# Patient Record
Sex: FEMALE | Race: WHITE | Employment: UNEMPLOYED | URBAN - METROPOLITAN AREA
[De-identification: names, ages, dates, MRNs, and addresses within clinical notes are randomized per-mention and may not be internally consistent; named-entity substitution may affect disease eponyms.]

---

## 2017-01-03 ENCOUNTER — TELEPHONE (OUTPATIENT)
Dept: SURGERY | Age: 57
End: 2017-01-03

## 2017-01-03 DIAGNOSIS — N60.11 FIBROCYSTIC BREAST CHANGES OF BOTH BREASTS: Primary | ICD-10-CM

## 2017-01-03 DIAGNOSIS — N60.12 FIBROCYSTIC BREAST CHANGES OF BOTH BREASTS: Primary | ICD-10-CM

## 2017-01-03 DIAGNOSIS — Z80.3 FAMILY HISTORY OF BREAST CANCER: ICD-10-CM

## 2017-01-03 DIAGNOSIS — Z85.3 HISTORY OF BREAST CANCER IN FEMALE: ICD-10-CM

## 2017-01-03 NOTE — TELEPHONE ENCOUNTER
Called today and L/M on nurse voicemail asking to be scheduled at [facility] for her yearly MRI in February. Also wanted to know when to have her next mammogram.     I called patient back. It looks like her mammogram done in August was only a RIGHT mammogram, so I advised that she is due for a BILATERAL screening mammogram after January 12, 2017. She will schedule this at [place], and I gave her the number to schedule. We had all of her images loaded from [facility] to our PACS system last year when she had an abnormal MRI at [place] [address]. I encouraged her to have her imaging done here at a [facility] to keep it all in the same place. She had wanted to go to [facility] because she can get a 7:30 appointment and can \"get in and out quickly. \"  I reviewed the many location choices that we have and again encouraged her to have all of her imaging at one of our facilities. I advised by doing this our providers are able to review imaging and consult with radiologists if necessary. She liked this idea and appreciated the suggestion. She would like to schedule her MRI at a [facility], probably [facility]. She would also like to schedule her follow-up in February, and I transferred her to one of of PSRs to schedule this. She was extremely appreciative of the phone call and information.

## 2017-01-12 DIAGNOSIS — Z12.39 BREAST CANCER SCREENING, HIGH RISK PATIENT: Primary | ICD-10-CM

## 2017-01-16 ENCOUNTER — HOSPITAL ENCOUNTER (OUTPATIENT)
Dept: MRI IMAGING | Age: 57
Discharge: HOME | End: 2017-01-16
Attending: NURSE PRACTITIONER
Payer: COMMERCIAL

## 2017-01-16 VITALS — WEIGHT: 150 LBS | BODY MASS INDEX: 24.21 KG/M2

## 2017-01-16 DIAGNOSIS — N60.11 FIBROCYSTIC BREAST CHANGES OF BOTH BREASTS: ICD-10-CM

## 2017-01-16 DIAGNOSIS — N60.12 FIBROCYSTIC BREAST CHANGES OF BOTH BREASTS: ICD-10-CM

## 2017-01-16 DIAGNOSIS — Z80.3 FAMILY HISTORY OF BREAST CANCER: ICD-10-CM

## 2017-01-16 PROCEDURE — 74011250636 HC RX REV CODE- 250/636: Performed by: NURSE PRACTITIONER

## 2017-01-16 PROCEDURE — 77059 MRI BREAST BI W WO CONT: CPT

## 2017-01-16 PROCEDURE — A9585 GADOBUTROL INJECTION: HCPCS | Performed by: NURSE PRACTITIONER

## 2017-01-16 RX ADMIN — GADOBUTROL 7 ML: 604.72 INJECTION INTRAVENOUS at 08:49

## 2017-01-17 ENCOUNTER — HOSPITAL ENCOUNTER (OUTPATIENT)
Dept: MAMMOGRAPHY | Age: 57
Discharge: HOME | End: 2017-01-17
Attending: NURSE PRACTITIONER
Payer: SELF-PAY

## 2017-01-17 ENCOUNTER — TELEPHONE (OUTPATIENT)
Dept: SURGERY | Age: 57
End: 2017-01-17

## 2017-01-17 DIAGNOSIS — N60.12 FIBROCYSTIC BREAST CHANGES OF BOTH BREASTS: ICD-10-CM

## 2017-01-17 DIAGNOSIS — N60.11 FIBROCYSTIC BREAST CHANGES OF BOTH BREASTS: ICD-10-CM

## 2017-01-17 DIAGNOSIS — Z85.3 HISTORY OF BREAST CANCER IN FEMALE: ICD-10-CM

## 2017-01-17 PROCEDURE — 77067 SCR MAMMO BI INCL CAD: CPT

## 2017-01-18 ENCOUNTER — TELEPHONE (OUTPATIENT)
Dept: SURGERY | Age: 57
End: 2017-01-18

## 2017-01-18 NOTE — TELEPHONE ENCOUNTER
Called patient, per Hal Choi NP, to let her know her MRI and mammogram results were normal. She is happy to hear this. Next appointment is on 2/7/17. She has no questions at this time.

## 2017-02-13 ENCOUNTER — DOCUMENTATION ONLY (OUTPATIENT)
Dept: SURGERY | Age: 57
End: 2017-02-13

## 2017-02-14 ENCOUNTER — OFFICE VISIT (OUTPATIENT)
Dept: SURGERY | Age: 57
End: 2017-02-14

## 2017-02-14 VITALS
WEIGHT: 150 LBS | HEIGHT: 66 IN | DIASTOLIC BLOOD PRESSURE: 86 MMHG | BODY MASS INDEX: 24.11 KG/M2 | HEART RATE: 87 BPM | SYSTOLIC BLOOD PRESSURE: 152 MMHG

## 2017-02-14 DIAGNOSIS — N60.12 FIBROCYSTIC BREAST CHANGES OF BOTH BREASTS: Primary | ICD-10-CM

## 2017-02-14 DIAGNOSIS — Z80.3 FAMILY HISTORY OF BREAST CANCER: ICD-10-CM

## 2017-02-14 DIAGNOSIS — N60.11 FIBROCYSTIC BREAST CHANGES OF BOTH BREASTS: Primary | ICD-10-CM

## 2017-02-14 RX ORDER — CLOTRIMAZOLE AND BETAMETHASONE DIPROPIONATE 10; .64 MG/G; MG/G
CREAM TOPICAL
Refills: 0 | COMMUNITY
Start: 2016-12-11

## 2017-02-14 NOTE — MR AVS SNAPSHOT
Visit Information Date & Time Provider Department Dept. Phone Encounter #  
 2/14/2017  9:00 AM Silvia Martinez NP [address] at [address] [place] [address] [id number] Upcoming Health Maintenance Date Due Hepatitis C Screening 1960 DTaP/Tdap/Td series (1 - Tdap) 7/25/1983 PAP AKA CERVICAL CYTOLOGY 7/25/1983 FOBT Q 1 YEAR AGE 50-75 7/25/2012 INFLUENZA AGE 9 TO ADULT 8/1/2016 BREAST CANCER SCRN MAMMOGRAM 1/17/2019 Allergies as of 2/14/2017  Review Complete On: 2/14/2017 By: Silvia Martinez NP Severity Noted Reaction Type Reactions Sulfa (Sulfonamide Antibiotics)  02/17/2016    Unknown (comments) Current Immunizations  Never Reviewed No immunizations on file. Not reviewed this visit You Were Diagnosed With   
  
 Codes Comments Fibrocystic breast changes of both breasts    -  Primary ICD-10-CM: N60.11, N60.12 ICD-9-CM: 610.1 Family history of breast cancer     ICD-10-CM: Z80.3 ICD-9-CM: V16.3 Vitals BP Pulse Height(growth percentile) Weight(growth percentile) BMI OB Status 152/86 87 5' 6\" (1.676 m) 150 lb (68 kg) 24.21 kg/m2 Having regular periods Smoking Status Never Smoker BMI and BSA Data Body Mass Index Body Surface Area  
 24.21 kg/m 2 1.78 m 2 Your Updated Medication List  
  
   
This list is accurate as of: 2/14/17  4:29 PM.  Always use your most recent med list.  
  
  
  
  
 clotrimazole-betamethasone topical cream  
Commonly known as:  LOTRISONE  
APPLY TO AFFECTED AREA(S) TWICE DAILY AS NEEDED Patient Instructions Breast Self-Exam: Care Instructions Your Care Instructions A breast self-exam is when you check your breasts for lumps or changes. This regular exam helps you learn how your breasts normally look and feel. Most breast problems or changes are not because of cancer. Breast self-exam is not a substitute for a mammogram. Having regular breast exams by your doctor and regular mammograms improve your chances of finding any problems with your breasts. Some women set a time each month to do a step-by-step breast self-exam. Other women like a less formal system. They might look at their breasts as they brush their teeth, or feel their breasts once in a while in the shower. If you notice a change in your breast, tell your doctor. Follow-up care is a key part of your treatment and safety. Be sure to make and go to all appointments, and call your doctor if you are having problems. Its also a good idea to know your test results and keep a list of the medicines you take. How do you do a breast self-exam? 
· The best time to examine your breasts is usually one week after your menstrual period begins. Your breasts should not be tender then. If you do not have periods, you might do your exam on a day of the month that is easy to remember. · To examine your breasts: ¨ Remove all your clothes above the waist and lie down. When you are lying down, your breast tissue spreads evenly over your chest wall, which makes it easier to feel all your breast tissue. ¨ Use the padsnot the fingertipsof the 3 middle fingers of your left hand to check your right breast. Move your fingers slowly in small coin-sized circles that overlap. ¨ Use three levels of pressure to feel of all your breast tissue. Use light pressure to feel the tissue close to the skin surface. Use medium pressure to feel a little deeper. Use firm pressure to feel your tissue close to your breastbone and ribs. Use each pressure level to feel your breast tissue before moving on to the next spot. ¨ Check your entire breast, moving up and down as if following a strip from the collarbone to the bra line, and from the armpit to the ribs.  Repeat until you have covered the entire breast. 
 ¨ Repeat this procedure for your left breast, using the pads of the 3 middle fingers of your right hand. · To examine your breasts while in the shower: 
¨ Place one arm over your head and lightly soap your breast on that side. ¨ Using the pads of your fingers, gently move your hand over your breast (in the strip pattern described above), feeling carefully for any lumps or changes. ¨ Repeat for the other breast. 
· Have your doctor inspect anything you notice to see if you need further testing. Where can you learn more? Go to http://rivera-jose angel.info/. Enter P148 in the search box to learn more about \"Breast Self-Exam: Care Instructions. \" Current as of: July 26, 2016 Content Version: 11.1 © 6725-6164 [employer]. Care instructions adapted under license by [employer] (which disclaims liability or warranty for this information). If you have questions about a medical condition or this instruction, always ask your healthcare professional. [place] [address] any warranty or liability for your use of this information. Introducing [facility]! Graciela Pagan introduces Ghostruck patient portal. Now you can access parts of your medical record, email your doctor's office, and request medication refills online. 1. In your internet browser, go to [email or web address]://[facility]. [employer]/[facility] 2. Click on the First Time User? Click Here link in the Sign In box. You will see the New Member Sign Up page. 3. Enter your Ghostruck Access Code exactly as it appears below. You will not need to use this code after youve completed the sign-up process. If you do not sign up before the expiration date, you must request a new code. · Ghostruck Access Code: [email or web address] Expires: 4/3/2017  1:55 PM 
 
4. Enter the last four digits of your Social Security Number (xxxx) and Date of Birth (mm/dd/yyyy) as indicated and click Submit.  You will be taken to the next sign-up page. 5. Create a Handprint ID. This will be your Handprint login ID and cannot be changed, so think of one that is secure and easy to remember. 6. Create a Handprint password. You can change your password at any time. 7. Enter your Password Reset Question and Answer. This can be used at a later time if you forget your password. 8. Enter your e-mail address. You will receive e-mail notification when new information is available in [address]. 9. Click Sign Up. You can now view and download portions of your medical record. 10. Click the Download Summary menu link to download a portable copy of your medical information. If you have questions, please visit the Frequently Asked Questions section of the Handprint website. Remember, Handprint is NOT to be used for urgent needs. For medical emergencies, dial 911. Now available from your iPhone and Android! Please provide this summary of care documentation to your next provider. Your primary care clinician is listed as Patience Hunt. If you have any questions after today's visit, please call [phone].

## 2017-02-14 NOTE — PATIENT INSTRUCTIONS
Breast Self-Exam: Care Instructions  Your Care Instructions  A breast self-exam is when you check your breasts for lumps or changes. This regular exam helps you learn how your breasts normally look and feel. Most breast problems or changes are not because of cancer. Breast self-exam is not a substitute for a mammogram. Having regular breast exams by your doctor and regular mammograms improve your chances of finding any problems with your breasts. Some women set a time each month to do a step-by-step breast self-exam. Other women like a less formal system. They might look at their breasts as they brush their teeth, or feel their breasts once in a while in the shower. If you notice a change in your breast, tell your doctor. Follow-up care is a key part of your treatment and safety. Be sure to make and go to all appointments, and call your doctor if you are having problems. Its also a good idea to know your test results and keep a list of the medicines you take. How do you do a breast self-exam?  · The best time to examine your breasts is usually one week after your menstrual period begins. Your breasts should not be tender then. If you do not have periods, you might do your exam on a day of the month that is easy to remember. · To examine your breasts:  ¨ Remove all your clothes above the waist and lie down. When you are lying down, your breast tissue spreads evenly over your chest wall, which makes it easier to feel all your breast tissue. ¨ Use the pads--not the fingertips--of the 3 middle fingers of your left hand to check your right breast. Move your fingers slowly in small coin-sized circles that overlap. ¨ Use three levels of pressure to feel of all your breast tissue. Use light pressure to feel the tissue close to the skin surface. Use medium pressure to feel a little deeper. Use firm pressure to feel your tissue close to your breastbone and ribs.  Use each pressure level to feel your breast tissue before moving on to the next spot. ¨ Check your entire breast, moving up and down as if following a strip from the collarbone to the bra line, and from the armpit to the ribs. Repeat until you have covered the entire breast.  ¨ Repeat this procedure for your left breast, using the pads of the 3 middle fingers of your right hand. · To examine your breasts while in the shower:  ¨ Place one arm over your head and lightly soap your breast on that side. ¨ Using the pads of your fingers, gently move your hand over your breast (in the strip pattern described above), feeling carefully for any lumps or changes. ¨ Repeat for the other breast.  · Have your doctor inspect anything you notice to see if you need further testing. Where can you learn more? Go to http://rivera-jose angel.info/. Enter P148 in the search box to learn more about \"Breast Self-Exam: Care Instructions. \"  Current as of: July 26, 2016  Content Version: 11.1  © 2596-8313 [employer]. Care instructions adapted under license by [employer] (which disclaims liability or warranty for this information). If you have questions about a medical condition or this instruction, always ask your healthcare professional. [place] [address] any warranty or liability for your use of this information.

## 2017-02-14 NOTE — PROGRESS NOTES
HISTORY OF PRESENT ILLNESS  Francisca Saldana is a 49 y.o. female. HPI  ESTABLISHED patient here today for follow up as a high risk patient due to her family history. Denies any breast problems at this time. Had abnormal MRI in 2016, but no biopsy was required    OB History      Para Term  AB TAB SAB Ectopic Multiple Living    0 0 0 0 0 0 0 0 0 0        Obstetric Comments    Menarche:  14. LMP: perimenopausal.  # of Children:  0. Age at Delivery of First Child:  n/a. Hysterectomy/oophorectomy:  NO/NO. Breast Bx:  no.  Hx of Breast Feeding:  n/a. BCP:  Yes  to . Hormone therapy:  no.         Past Surgical History   Procedure Laterality Date    Hx tubal ligation      Hx urological       donated a kidney     FH includes-  Mother who was diagnosed with breast cancer at age 61. Still living. Recent imaging-  Breast MRI on 17- BIRADS 2  FINDINGS: Background parenchymal enhancement is minimal to mild . A 2.0 cm T2  hyperintense mass in the right upper quadrant posteriorly with ghosting may  represent a hepatic cyst in segment 6, as there is is no contrast enhancement. This portion of the liver was not included on prior examinations, and there is  no prior abdominal imaging in this system. .  Right breast: An oval relatively well-circumscribed mass appears deep in the  right inner breast at 3:00 in the posterior coronal third, showing minimal  delayed enhancement, measuring 1.7 x 0.7 x 0.9 cm, compared to similar  measurements of 1.8 x 0.7 x 0.8 cm on the prior study. No other dominant  abnormality is suggested. There is no lymphadenopathy. Left breast: No dominant abnormality of morphology or enhancement to suggest  malignancy. No lymphadenopathy. IMPRESSION:  1. Right BI-RADS 2, benign. Left BI-RADS 2, benign. 2. RIGHT BREAST: Stable benign-appearing mass deep in the right breast at 3:00,  consistent with the previous impression of fibroadenoma.  No MRI sign of  malignancy. 3. LEFT BREAST: No MRI sign of malignancy. 4. LIVER: Incidentally noted mass in segment 6 thought to represent a cyst.  Comparison with any prior abdominal imaging may be helpful. 5. RECOMMENDATION: Annual screening mammography and annual screening MRI in this  high-risk patient. 6. A summary portfolio has been created for reference and is available in PACS. 3D bilateral screening mammogram on 1/17/17- BIRADS 1  FINDINGS: Bilateral digital screening mammography was performed, and is  interpreted in conjunction with a computer assisted detection (CAD) system. In  addition to standard 2-D bilateral CC and MLO views, bilateral 3-D/tomosynthesis  was performed in CC and MLO projections. No suspicious masses or calcifications are identified. There is no skin  thickening or nipple retraction. There has been no significant change. IMPRESSION:  BIRADS 1: Negative. No mammographic evidence of malignancy. ROS    Physical Exam   Constitutional: She is oriented to person, place, and time. She appears well-developed and well-nourished. Pulmonary/Chest: Right breast exhibits no inverted nipple, no mass, no nipple discharge, no skin change and no tenderness. Left breast exhibits no inverted nipple, no mass, no nipple discharge, no skin change and no tenderness. Breasts are symmetrical.       Musculoskeletal: Normal range of motion. UE x 2   Lymphadenopathy:     She has no cervical adenopathy. She has no axillary adenopathy. Right: No supraclavicular adenopathy present. Left: No supraclavicular adenopathy present. Neurological: She is alert and oriented to person, place, and time. Psychiatric: She has a normal mood and affect. Her speech is normal and behavior is normal.     Visit Vitals    /86    Pulse 87    Ht 5' 6\" (1.676 m)    Wt 150 lb (68 kg)    BMI 24.21 kg/m2     ASSESSMENT and PLAN  Encounter Diagnoses   Name Primary?     Fibrocystic breast changes of both breasts Yes    Family history of breast cancer      Normal exam and imaging. We discussed her risk based on her family history. The patient's risk of breast cancer was calculated using the [address] Her 10 year risk is 8% (compared with a population risk of 3%). Her lifetime risk is 26% (compared with a population risk of 10.5%). This risk was reviewed with the patient. She will plan to be seen here in 1 year and have her annual screening mammogram and screening MRI. She will also have a CBE with her GYN. We will continue to discuss if screening MRIs and annual visits here are necessary moving forward. She is comfortable with this plan. All questions answered and she stated understanding. Patient also inquired about a dermatologist and was referred to Dr. Elizabeth Priest at [facility].

## 2017-08-07 ENCOUNTER — TELEPHONE (OUTPATIENT)
Dept: SURGERY | Age: 57
End: 2017-08-07

## 2018-01-03 DIAGNOSIS — Z91.89 AT HIGH RISK FOR BREAST CANCER: Primary | ICD-10-CM

## 2018-01-04 ENCOUNTER — DOCUMENTATION ONLY (OUTPATIENT)
Dept: SURGERY | Age: 58
End: 2018-01-04

## 2018-01-04 NOTE — PROGRESS NOTES
Appointments for this Order   1/18/2018 0815 - 45 min MARY MRI 1 (Resource) [facility]

## 2018-01-18 ENCOUNTER — HOSPITAL ENCOUNTER (OUTPATIENT)
Dept: MAMMOGRAPHY | Age: 58
Discharge: HOME | End: 2018-01-18
Attending: SURGERY
Payer: COMMERCIAL

## 2018-01-18 ENCOUNTER — HOSPITAL ENCOUNTER (OUTPATIENT)
Dept: MRI IMAGING | Age: 58
Discharge: HOME | End: 2018-01-18
Attending: NURSE PRACTITIONER
Payer: COMMERCIAL

## 2018-01-18 ENCOUNTER — TELEPHONE (OUTPATIENT)
Dept: SURGERY | Age: 58
End: 2018-01-18

## 2018-01-18 VITALS — BODY MASS INDEX: 24.21 KG/M2 | WEIGHT: 150 LBS

## 2018-01-18 DIAGNOSIS — Z12.39 SCREENING BREAST EXAMINATION: ICD-10-CM

## 2018-01-18 DIAGNOSIS — Z91.89 AT HIGH RISK FOR BREAST CANCER: ICD-10-CM

## 2018-01-18 LAB — CREAT BLD-MCNC: 0.9 MG/DL (ref 0.6–1.3)

## 2018-01-18 PROCEDURE — 77059 MRI BREAST BI W WO CONT: CPT

## 2018-01-18 PROCEDURE — 82565 ASSAY OF CREATININE: CPT

## 2018-01-18 PROCEDURE — 74011250636 HC RX REV CODE- 250/636: Performed by: NURSE PRACTITIONER

## 2018-01-18 PROCEDURE — 77063 BREAST TOMOSYNTHESIS BI: CPT

## 2018-01-18 PROCEDURE — A9585 GADOBUTROL INJECTION: HCPCS | Performed by: NURSE PRACTITIONER

## 2018-01-18 RX ADMIN — GADOBUTROL 7 ML: 604.72 INJECTION INTRAVENOUS at 08:38

## 2018-01-18 NOTE — TELEPHONE ENCOUNTER
Patient called asking about her MRI and mammogram results, which she had today. I called her back to give her the MRI results, which were normal. I told her the mammogram was not back yet, but should be resulted by tomorrow. She is appreciative. She has appointment with Alona Whitfield NP on 1/23/18.

## 2018-01-22 ENCOUNTER — DOCUMENTATION ONLY (OUTPATIENT)
Dept: SURGERY | Age: 58
End: 2018-01-22

## 2018-01-23 ENCOUNTER — OFFICE VISIT (OUTPATIENT)
Dept: SURGERY | Age: 58
End: 2018-01-23

## 2018-01-23 VITALS
DIASTOLIC BLOOD PRESSURE: 84 MMHG | HEIGHT: 66 IN | BODY MASS INDEX: 24.11 KG/M2 | SYSTOLIC BLOOD PRESSURE: 146 MMHG | WEIGHT: 150 LBS | HEART RATE: 101 BPM

## 2018-01-23 DIAGNOSIS — N60.12 FIBROCYSTIC BREAST CHANGES OF BOTH BREASTS: Primary | ICD-10-CM

## 2018-01-23 DIAGNOSIS — N60.11 FIBROCYSTIC BREAST CHANGES OF BOTH BREASTS: Primary | ICD-10-CM

## 2018-01-23 DIAGNOSIS — Z80.3 FAMILY HISTORY OF BREAST CANCER: ICD-10-CM

## 2018-01-23 NOTE — PATIENT INSTRUCTIONS
Breast Self-Exam: Care Instructions  Your Care Instructions    A breast self-exam is when you check your breasts for lumps or changes. This regular exam helps you learn how your breasts normally look and feel. Most breast problems or changes are not because of cancer. Breast self-exam is not a substitute for a mammogram. Having regular breast exams by your doctor and regular mammograms improve your chances of finding any problems with your breasts. Some women set a time each month to do a step-by-step breast self-exam. Other women like a less formal system. They might look at their breasts as they brush their teeth, or feel their breasts once in a while in the shower. If you notice a change in your breast, tell your doctor. Follow-up care is a key part of your treatment and safety. Be sure to make and go to all appointments, and call your doctor if you are having problems. It's also a good idea to know your test results and keep a list of the medicines you take. How do you do a breast self-exam?  · The best time to examine your breasts is usually one week after your menstrual period begins. Your breasts should not be tender then. If you do not have periods, you might do your exam on a day of the month that is easy to remember. · To examine your breasts:  ¨ Remove all your clothes above the waist and lie down. When you are lying down, your breast tissue spreads evenly over your chest wall, which makes it easier to feel all your breast tissue. ¨ Use the pads-not the fingertips-of the 3 middle fingers of your left hand to check your right breast. Move your fingers slowly in small coin-sized circles that overlap. ¨ Use three levels of pressure to feel of all your breast tissue. Use light pressure to feel the tissue close to the skin surface. Use medium pressure to feel a little deeper. Use firm pressure to feel your tissue close to your breastbone and ribs.  Use each pressure level to feel your breast tissue before moving on to the next spot. ¨ Check your entire breast, moving up and down as if following a strip from the collarbone to the bra line, and from the armpit to the ribs. Repeat until you have covered the entire breast.  ¨ Repeat this procedure for your left breast, using the pads of the 3 middle fingers of your right hand. · To examine your breasts while in the shower:  ¨ Place one arm over your head and lightly soap your breast on that side. ¨ Using the pads of your fingers, gently move your hand over your breast (in the strip pattern described above), feeling carefully for any lumps or changes. ¨ Repeat for the other breast.  · Have your doctor inspect anything you notice to see if you need further testing. Where can you learn more? Go to http://rivera-jose angel.info/. Enter P148 in the search box to learn more about \"Breast Self-Exam: Care Instructions. \"  Current as of: May 12, 2017  Content Version: 11.4  © 9636-9414 [employer], Incorporated. Care instructions adapted under license by [employer] (which disclaims liability or warranty for this information). If you have questions about a medical condition or this instruction, always ask your healthcare professional. [place] [address] any warranty or liability for your use of this information.

## 2018-01-23 NOTE — PROGRESS NOTES
HISTORY OF PRESENT ILLNESS  Marisol Loza is a 56 y.o. female. HPI   ESTABLISHED patient here today for follow up as a high risk patient due to her family history. Denies any breast problems at this time.      Had abnormal MRI in 2016, but no biopsy was required    FH includes-  Mother who was diagnosed with breast cancer at age 61. Still living. Recent imaging-  3D bilateral screening mammogram on 1/18/18- BIRADS 1    Breast MRI on 1/18/18- BIRADS 2  FINDINGS: Background parenchymal enhancement is minimal .  Right breast: An oval mass shows delayed enhancement in the right breast at  3:00, mid to posterior depth, measuring 1.5 x 0.9 x 1.3 cm, stable since the  prior study. No dominant abnormality of morphology or enhancement and no change  which suggests malignancy. No lymphadenopathy. Left breast: No dominant abnormality of morphology or enhancement to suggest  malignancy. No lymphadenopathy. IMPRESSION:  1. BI-RADS 2, benign. 2. RIGHT BREAST: Stable benign-appearing mass at 3:00 suggesting a fibroadenoma. 3. LEFT BREAST: No MRI sign of malignancy. 4. RECOMMENDATION: Annual screening mammography (due currently) and annual  screening MRI in this high-risk patient (next due January 2019). 5. A summary portfolio has been created and is included with acquired images in  PACS. ROS    Physical Exam   Constitutional: She appears well-developed and well-nourished. Pulmonary/Chest: Right breast exhibits no inverted nipple, no mass, no nipple discharge, no skin change and no tenderness. Left breast exhibits no inverted nipple, no mass, no nipple discharge, no skin change and no tenderness. Breasts are symmetrical.   Musculoskeletal: Normal range of motion. UE x 2   Lymphadenopathy:     She has no cervical adenopathy. She has no axillary adenopathy. Right: No supraclavicular adenopathy present. Left: No supraclavicular adenopathy present. Skin: Skin is warm, dry and intact.    Chest and breasts examined   Psychiatric: She has a normal mood and affect. Her speech is normal and behavior is normal.     Visit Vitals    /84    Pulse (!) 101    Ht 5' 6\" (1.676 m)    Wt 150 lb (68 kg)    LMP 03/01/2017    BMI 24.21 kg/m2     ASSESSMENT and PLAN  Encounter Diagnoses   Name Primary?  Fibrocystic breast changes of both breasts Yes    Family history of breast cancer      Normal exam and imaging with no evidence of breast malignancy. She will plan to have a 3D screening mammogram next year. She will have a breast MRI as well in 1/2019 and we will evaluate if it is necessary to continue the annual MRIs. She is amenable to continuing with just mammograms if that is recommended. She is headed to [facility] for vacation next month. She will RTC here in 1 year or sooner PRN. She is comfortable with this plan. All questions answered and she stated understanding.

## 2018-01-23 NOTE — PROGRESS NOTES
HISTORY OF PRESENT ILLNESS  Cari Yousif is a 56 y.o. female. Other     ESTABLISHED patient here today for follow up as a high risk patient due to her family history. Denies any breast problems at this time.      Had abnormal MRI in 2016, but no biopsy was required    OB History      Para Term  AB Living    0 0 0 0 0 0    SAB TAB Ectopic Molar Multiple Live Births    0 0 0  0         Obstetric Comments    Menarche:  14. LMP: perimenopausal. Last period 3/2017. # of Children:  0. Age at Delivery of First Child:  n/a. Hysterectomy/oophorectomy:  NO/NO. Breast Bx:  no.  Hx of Breast Feeding:  n/a. BCP:  Yes  to 00. Hormone therapy:  no.         Past Surgical History:   Procedure Laterality Date    HX TUBAL LIGATION      HX UROLOGICAL      donated a kidney     FH includes-  Mother who was diagnosed with breast cancer at age 61. Still living. Recent imaging-  3D bilateral screening mammogram on 18- BIRADS 1    Breast MRI on 18- BIRADS 2  FINDINGS: Background parenchymal enhancement is minimal .  Right breast: An oval mass shows delayed enhancement in the right breast at  3:00, mid to posterior depth, measuring 1.5 x 0.9 x 1.3 cm, stable since the  prior study. No dominant abnormality of morphology or enhancement and no change  which suggests malignancy. No lymphadenopathy. Left breast: No dominant abnormality of morphology or enhancement to suggest  malignancy. No lymphadenopathy. IMPRESSION:  1. BI-RADS 2, benign. 2. RIGHT BREAST: Stable benign-appearing mass at 3:00 suggesting a fibroadenoma. 3. LEFT BREAST: No MRI sign of malignancy. 4. RECOMMENDATION: Annual screening mammography (due currently) and annual  screening MRI in this high-risk patient (next due 2019). 5. A summary portfolio has been created and is included with acquired images in  PACS. ROS    Physical Exam   Constitutional: She appears well-developed and well-nourished. Pulmonary/Chest: Right breast exhibits no inverted nipple, no mass, no nipple discharge, no skin change and no tenderness. Left breast exhibits no inverted nipple, no mass, no nipple discharge, no skin change and no tenderness. Breasts are symmetrical.   Musculoskeletal: Normal range of motion. UE x 2   Lymphadenopathy:     She has no cervical adenopathy. She has no axillary adenopathy. Right: No supraclavicular adenopathy present. Left: No supraclavicular adenopathy present. Skin: Skin is warm, dry and intact. Chest and breasts examined   Psychiatric: She has a normal mood and affect. Her speech is normal and behavior is normal.     Visit Vitals    /84    Pulse (!) 101    Ht 5' 6\" (1.676 m)    Wt 150 lb (68 kg)    LMP 03/01/2017    BMI 24.21 kg/m2     ASSESSMENT and PLAN  Encounter Diagnoses   Name Primary?  Fibrocystic breast changes of both breasts Yes    Family history of breast cancer      Normal exam and imaging with no evidence of breast malignancy. She will plan to have a 3D screening mammogram next year. She will have a breast MRI as well in 1/2019 and we will evaluate if it is necessary to continue the annual MRIs. She is amenable to continuing with just mammograms if that is recommended. Reports that her LMP was 3/2017. She is headed to [facility] for vacation next month. She will RTC here in 1 year or sooner PRN. She is comfortable with this plan. All questions answered and she stated understanding.

## 2018-01-23 NOTE — MR AVS SNAPSHOT
[address] [address] [place] [address] 
[phone] Patient: Shahid Echevarria MRN: [id number] :1960 Visit Information Date & Time Provider Department Dept. Phone Encounter #  
 2018  8:30 AM Catalino Cortez NP [address] at [facility] [phone] Upcoming Health Maintenance Date Due Hepatitis C Screening 1960 DTaP/Tdap/Td series (1 - Tdap) 1983 PAP AKA CERVICAL CYTOLOGY 1983 FOBT Q 1 YEAR AGE 50-75 2012 Influenza Age 5 to Adult 2017 BREAST CANCER SCRN MAMMOGRAM 2020 Allergies as of 2018  Review Complete On: 2017 By: Catalino Cortez NP Severity Noted Reaction Type Reactions Sulfa (Sulfonamide Antibiotics)  2016    Unknown (comments) Current Immunizations  Never Reviewed No immunizations on file. Not reviewed this visit Vitals BP Pulse Height(growth percentile) Weight(growth percentile) LMP BMI  
 146/84 (!) 101 5' 6\" (1.676 m) 150 lb (68 kg) 2017 24.21 kg/m2 OB Status Smoking Status Perimenopausal Never Smoker BMI and BSA Data Body Mass Index Body Surface Area  
 24.21 kg/m 2 1.78 m 2 Your Updated Medication List  
  
   
This list is accurate as of: 18  8:49 AM.  Always use your most recent med list.  
  
  
  
  
 clotrimazole-betamethasone topical cream  
Commonly known as:  LOTRISONE  
APPLY TO AFFECTED AREA(S) TWICE DAILY AS NEEDED Patient Instructions Breast Self-Exam: Care Instructions Your Care Instructions A breast self-exam is when you check your breasts for lumps or changes. This regular exam helps you learn how your breasts normally look and feel. Most breast problems or changes are not because of cancer.  
Breast self-exam is not a substitute for a mammogram. Having regular breast exams by your doctor and regular mammograms improve your chances of finding any problems with your breasts. Some women set a time each month to do a step-by-step breast self-exam. Other women like a less formal system. They might look at their breasts as they brush their teeth, or feel their breasts once in a while in the shower. If you notice a change in your breast, tell your doctor. Follow-up care is a key part of your treatment and safety. Be sure to make and go to all appointments, and call your doctor if you are having problems. It's also a good idea to know your test results and keep a list of the medicines you take. How do you do a breast self-exam? 
· The best time to examine your breasts is usually one week after your menstrual period begins. Your breasts should not be tender then. If you do not have periods, you might do your exam on a day of the month that is easy to remember. · To examine your breasts: ¨ Remove all your clothes above the waist and lie down. When you are lying down, your breast tissue spreads evenly over your chest wall, which makes it easier to feel all your breast tissue. ¨ Use the pads-not the fingertips-of the 3 middle fingers of your left hand to check your right breast. Move your fingers slowly in small coin-sized circles that overlap. ¨ Use three levels of pressure to feel of all your breast tissue. Use light pressure to feel the tissue close to the skin surface. Use medium pressure to feel a little deeper. Use firm pressure to feel your tissue close to your breastbone and ribs. Use each pressure level to feel your breast tissue before moving on to the next spot. ¨ Check your entire breast, moving up and down as if following a strip from the collarbone to the bra line, and from the armpit to the ribs. Repeat until you have covered the entire breast. 
¨ Repeat this procedure for your left breast, using the pads of the 3 middle fingers of your right hand. · To examine your breasts while in the shower: 
¨ Place one arm over your head and lightly soap your breast on that side. ¨ Using the pads of your fingers, gently move your hand over your breast (in the strip pattern described above), feeling carefully for any lumps or changes. ¨ Repeat for the other breast. 
· Have your doctor inspect anything you notice to see if you need further testing. Where can you learn more? Go to http://rivera-jose angel.info/. Enter P148 in the search box to learn more about \"Breast Self-Exam: Care Instructions. \" Current as of: May 12, 2017 Content Version: 11.4 © 6515-2432 [employer]. Care instructions adapted under license by [employer] (which disclaims liability or warranty for this information). If you have questions about a medical condition or this instruction, always ask your healthcare professional. [address] [address] any warranty or liability for your use of this information. Introducing [facility]! [address] [facility] introduces The Local patient portal. Now you can access parts of your medical record, email your doctor's office, and request medication refills online. 1. In your internet browser, go to [email or web address] 2. Click on the First Time User? Click Here link in the Sign In box. You will see the New Member Sign Up page. 3. Enter your The Local Access Code exactly as it appears below. You will not need to use this code after youve completed the sign-up process. If you do not sign up before the expiration date, you must request a new code. · The Local Access Code: [email or web address] Expires: 4/3/2018 10:28 AM 
 
4. Enter the last four digits of your Social Security Number (xxxx) and Date of Birth (mm/dd/yyyy) as indicated and click Submit. You will be taken to the next sign-up page. 5. Create a The Local ID.  This will be your The Local login ID and cannot be changed, so think of one that is secure and easy to remember. 6. Create a [employer] password. You can change your password at any time. 7. Enter your Password Reset Question and Answer. This can be used at a later time if you forget your password. 8. Enter your e-mail address. You will receive e-mail notification when new information is available in [address] 9. Click Sign Up. You can now view and download portions of your medical record. 10. Click the Download Summary menu link to download a portable copy of your medical information. If you have questions, please visit the Frequently Asked Questions section of the [employer] website. Remember, [employer] is NOT to be used for urgent needs. For medical emergencies, dial 911. Now available from your iPhone and Android! Please provide this summary of care documentation to your next provider. Your primary care clinician is listed as Carlos Espinoza. If you have any questions after today's visit, please call [phone].

## 2018-12-26 ENCOUNTER — TELEPHONE (OUTPATIENT)
Dept: SURGERY | Age: 58
End: 2018-12-26

## 2018-12-26 DIAGNOSIS — Z80.3 FAMILY HISTORY OF BREAST CANCER: ICD-10-CM

## 2018-12-26 DIAGNOSIS — Z91.89 AT HIGH RISK FOR BREAST CANCER: Primary | ICD-10-CM

## 2018-12-26 NOTE — TELEPHONE ENCOUNTER
Breast MRI w/wo contrast after 1/18/19, can be coordinated with mammogram on 1/21/19 at [facility].      Order placed per Ariana De Souza NP

## 2019-01-21 ENCOUNTER — HOSPITAL ENCOUNTER (OUTPATIENT)
Dept: MAMMOGRAPHY | Age: 59
Discharge: HOME | End: 2019-01-21
Attending: NURSE PRACTITIONER
Payer: COMMERCIAL

## 2019-01-21 ENCOUNTER — HOSPITAL ENCOUNTER (OUTPATIENT)
Dept: MRI IMAGING | Age: 59
Discharge: HOME | End: 2019-01-21
Attending: NURSE PRACTITIONER
Payer: COMMERCIAL

## 2019-01-21 VITALS — BODY MASS INDEX: 25.02 KG/M2 | WEIGHT: 155 LBS

## 2019-01-21 DIAGNOSIS — Z12.39 SCREENING BREAST EXAMINATION: ICD-10-CM

## 2019-01-21 DIAGNOSIS — N60.12 FIBROCYSTIC BREAST CHANGES OF BOTH BREASTS: ICD-10-CM

## 2019-01-21 DIAGNOSIS — N60.11 FIBROCYSTIC BREAST CHANGES OF BOTH BREASTS: ICD-10-CM

## 2019-01-21 DIAGNOSIS — Z80.3 FAMILY HISTORY OF BREAST CANCER: ICD-10-CM

## 2019-01-21 DIAGNOSIS — Z91.89 AT HIGH RISK FOR BREAST CANCER: ICD-10-CM

## 2019-01-21 PROCEDURE — 74011250636 HC RX REV CODE- 250/636: Performed by: NURSE PRACTITIONER

## 2019-01-21 PROCEDURE — 74011000258 HC RX REV CODE- 258: Performed by: NURSE PRACTITIONER

## 2019-01-21 PROCEDURE — A9585 GADOBUTROL INJECTION: HCPCS | Performed by: NURSE PRACTITIONER

## 2019-01-21 PROCEDURE — 77063 BREAST TOMOSYNTHESIS BI: CPT

## 2019-01-21 PROCEDURE — 77049 MRI BREAST C-+ W/CAD BI: CPT

## 2019-01-21 RX ADMIN — SODIUM CHLORIDE 100 ML: 900 INJECTION, SOLUTION INTRAVENOUS at 08:00

## 2019-01-21 RX ADMIN — GADOBUTROL 7.5 ML: 604.72 INJECTION INTRAVENOUS at 07:59

## 2019-01-22 ENCOUNTER — OFFICE VISIT (OUTPATIENT)
Dept: SURGERY | Age: 59
End: 2019-01-22

## 2019-01-22 VITALS
DIASTOLIC BLOOD PRESSURE: 88 MMHG | BODY MASS INDEX: 25.02 KG/M2 | SYSTOLIC BLOOD PRESSURE: 144 MMHG | HEART RATE: 101 BPM | HEIGHT: 66 IN

## 2019-01-22 DIAGNOSIS — N60.12 FIBROCYSTIC BREAST CHANGES OF BOTH BREASTS: Primary | ICD-10-CM

## 2019-01-22 DIAGNOSIS — Z80.3 FAMILY HISTORY OF BREAST CANCER: ICD-10-CM

## 2019-01-22 DIAGNOSIS — N60.11 FIBROCYSTIC BREAST CHANGES OF BOTH BREASTS: Primary | ICD-10-CM

## 2019-01-22 NOTE — PATIENT INSTRUCTIONS

## 2019-01-22 NOTE — PROGRESS NOTES
HISTORY OF PRESENT ILLNESS  Kevin Moran is a 66 y.o. female. HPI  ESTABLISHED patient here for annual exam due to high risk family history of breast cancer. Denies any breast lumps, skin changes, or nipple discharge/retraction. No pain. FH includes-  Mother who was diagnosed with breast cancer at age 61. Still living.  19 - We reviewed the patient's history and her risk. The patient's risk of breast cancer was calculated using the [address] [place] [address] Street. Her 10 year risk is 7.9% (compared with a population risk of 3.3%). Her lifetime risk is 23.2% (compared with a population risk of 10%). OB History      Para Term  AB Living    0 0 0 0 0 0    SAB TAB Ectopic Molar Multiple Live Births    0 0 0   0          Obstetric Comments    Menarche:  17. LMP: perimenopausal. Last period 3/2017. # of Children:  0. Age at Delivery of First Child:  n/a. Hysterectomy/oophorectomy:  NO/NO. Breast Bx:  no.  Hx of Breast Feeding:  n/a. BCP:  Yes  to . Hormone therapy:  no.         Past Surgical History:   Procedure Laterality Date    HX TUBAL LIGATION      HX UROLOGICAL      donated a kidney      Mammogram, 19, BIRADS 1  Breast MRI, 19, BIRADS 1  ROS    Physical Exam   Constitutional: She appears well-developed and well-nourished. Pulmonary/Chest: Right breast exhibits no inverted nipple, no mass, no nipple discharge, no skin change and no tenderness. Left breast exhibits no inverted nipple, no mass, no nipple discharge, no skin change and no tenderness. Breasts are symmetrical.   Musculoskeletal: Normal range of motion. UE x 2   Lymphadenopathy:     She has no axillary adenopathy. Skin: Skin is warm, dry and intact. Chest and breasts examined   Psychiatric: She has a normal mood and affect.  Her speech is normal and behavior is normal.     Visit Vitals  /88   Pulse (!) 101   Ht 5' 6\" (1.676 m)   LMP 2017   BMI 25.02 kg/m²     ASSESSMENT and PLAN  Encounter Diagnoses   Name Primary?  Fibrocystic breast changes of both breasts Yes    Family history of breast cancer      Normal exam and imaging with no evidence of breast malignancy. Discussed her risk, the different types of breast imaging and the information they provide focusing on 3D mammograms and breast MRI. At this time she will continue with annual 3D mammograms and she will have breast MRIs PRN abnormal findings. She will continue her breast care through her PCP/GYN and RTC here PRN. She is comfortable with this plan. All questions answered and she stated understanding.

## 2023-05-17 RX ORDER — CLOTRIMAZOLE AND BETAMETHASONE DIPROPIONATE 10; .64 MG/G; MG/G
CREAM TOPICAL
COMMUNITY
Start: 2016-12-11